# Patient Record
Sex: FEMALE | Race: BLACK OR AFRICAN AMERICAN | NOT HISPANIC OR LATINO | Employment: FULL TIME | URBAN - METROPOLITAN AREA
[De-identification: names, ages, dates, MRNs, and addresses within clinical notes are randomized per-mention and may not be internally consistent; named-entity substitution may affect disease eponyms.]

---

## 2024-11-20 ENCOUNTER — HOSPITAL ENCOUNTER (OUTPATIENT)
Facility: HOSPITAL | Age: 24
Discharge: HOME | End: 2024-11-20
Attending: EMERGENCY MEDICINE | Admitting: EMERGENCY MEDICINE
Payer: COMMERCIAL

## 2024-11-20 VITALS
HEART RATE: 88 BPM | WEIGHT: 124.8 LBS | HEIGHT: 63 IN | BODY MASS INDEX: 22.11 KG/M2 | OXYGEN SATURATION: 100 % | RESPIRATION RATE: 20 BRPM | TEMPERATURE: 98.7 F | SYSTOLIC BLOOD PRESSURE: 99 MMHG | DIASTOLIC BLOOD PRESSURE: 69 MMHG

## 2024-11-20 DIAGNOSIS — B97.89 SORE THROAT (VIRAL): ICD-10-CM

## 2024-11-20 DIAGNOSIS — J02.8 SORE THROAT (VIRAL): ICD-10-CM

## 2024-11-20 DIAGNOSIS — J06.9 VIRAL URI WITH COUGH: Primary | ICD-10-CM

## 2024-11-20 LAB
FLUAV SUBTYP SPEC NAA+PROBE: NOT DETECTED
FLUBV RNA ISLT QL NAA+PROBE: NOT DETECTED
SARS-COV-2 RNA RESP QL NAA+PROBE: NOT DETECTED
STREP A PCR: NOT DETECTED

## 2024-11-20 PROCEDURE — 87636 SARSCOV2 & INF A&B AMP PRB: CPT

## 2024-11-20 PROCEDURE — G0463 HOSPITAL OUTPT CLINIC VISIT: HCPCS

## 2024-11-20 PROCEDURE — 87651 STREP A DNA AMP PROBE: CPT | Performed by: EMERGENCY MEDICINE

## 2024-11-20 RX ORDER — CETIRIZINE HYDROCHLORIDE, PSEUDOEPHEDRINE HYDROCHLORIDE 5; 120 MG/1; MG/1
1 TABLET, FILM COATED, EXTENDED RELEASE ORAL 2 TIMES DAILY
Qty: 14 TABLET | Refills: 0 | Status: SHIPPED | OUTPATIENT
Start: 2024-11-20 | End: 2024-11-27

## 2024-11-20 RX ORDER — FLUTICASONE PROPIONATE 50 MCG
2 SPRAY, SUSPENSION (ML) NASAL DAILY
Qty: 18.2 G | Refills: 0 | Status: SHIPPED | OUTPATIENT
Start: 2024-11-20

## 2024-11-20 NOTE — DISCHARGE INSTRUCTIONS
Alternate Tylenol and ibuprofen as needed for sore throat and fever    Increase your fluid intake with water and Pedialyte    Take Zyrtec-D to help with symptoms of nasal congestion watery eyes runny nose sore throat    Begin Flonase nasal steroid spray daily for the next 7 to 10 days    Chloraseptic spray to help with sore throat    Follow-up with family doctor as needed return to ER for worsening symptom

## 2024-11-20 NOTE — Clinical Note
[facility] [place] [address] [place] [address] [place] [address]  Phone: [phone]    Laquita Sanchez was seen and treated in our emergency department on 11/20/2024.  She may return to work on 11/23/2024.         Thank you for choosing [facility].    Shahbaz Jenkins Jr., ANGY

## 2024-11-20 NOTE — FSED PROVIDER NOTE
Subjective   History of Present Illness  24-year-old female reports a 2-day history of sinus congestion nasal congestion drainage in the back of her throat loss of voice body aches and fatigue.  She is denying chest pain shortness of breath vomiting and diarrhea.        Review of Systems   All other systems reviewed and are negative.      History reviewed. No pertinent past medical history.    No Known Allergies    History reviewed. No pertinent surgical history.    History reviewed. No pertinent family history.    Social History     Socioeconomic History    Marital status: Single           Objective   Physical Exam  Vitals and nursing note reviewed.   Constitutional:       General: She is not in acute distress.     Appearance: She is well-developed. She is not toxic-appearing.   HENT:      Head: Normocephalic and atraumatic.      Right Ear: Tympanic membrane normal.      Left Ear: Tympanic membrane normal.      Nose: Congestion (Reddened nasal membranes clear discharge) present.      Mouth/Throat:      Mouth: Mucous membranes are moist.      Pharynx: Oropharynx is clear. Uvula midline.      Tonsils: No tonsillar exudate or tonsillar abscesses.   Eyes:      Conjunctiva/sclera: Conjunctivae normal.      Pupils: Pupils are equal, round, and reactive to light.   Cardiovascular:      Rate and Rhythm: Normal rate.      Heart sounds: Normal heart sounds.   Pulmonary:      Effort: Pulmonary effort is normal. No respiratory distress.      Breath sounds: Normal breath sounds. No stridor. No wheezing, rhonchi or rales.   Abdominal:      Palpations: Abdomen is soft.   Musculoskeletal:      Cervical back: Normal range of motion.   Skin:     General: Skin is warm.      Capillary Refill: Capillary refill takes less than 2 seconds.   Neurological:      General: No focal deficit present.      Mental Status: She is alert.         Procedures           ED Course  ED Course as of 11/20/24 1244   Wed Nov 20, 2024   1230 COVID influenza  strep not detected [WF]      ED Course User Index  [WF] Shahbaz Jenkins Jr., APRN                                           Medical Decision Making  Influenza and strep are negative suspect viral upper respiratory infection and postnasal drainage    Patient is not currently on any medications will discharge home with a combination of Flonase and Zyrtec, Chloraseptic spray    Problems Addressed:  Sore throat (viral): acute illness or injury  Viral URI with cough: acute illness or injury    Risk  OTC drugs.        Final diagnoses:   Viral URI with cough   Sore throat (viral)       ED Disposition  ED Disposition       ED Disposition   Discharge    Condition   Stable    Comment   --               Hansa Oh MD  [address] [address]  [phone]               Medication List        New Prescriptions      cetirizine-pseudoephedrine 5-120 MG per 12 hr tablet  Commonly known as: ZyrTEC-D  Take 1 tablet by mouth 2 (Two) Times a Day for 7 days.     fluticasone 50 MCG/ACT nasal spray  Commonly known as: FLONASE  Administer 2 sprays into the nostril(s) as directed by provider Daily.     phenol 1.4 % liquid liquid  Commonly known as: CHLORASEPTIC  Apply 1 spray to the mouth or throat Every 2 (Two) Hours As Needed (sore throat).               Where to Get Your Medications        [id number] - [place], [place] [address] AT [address] [place] [address] - [phone] [facility] [phone] [address], [place] [address]      Phone: [phone]   cetirizine-pseudoephedrine 5-120 MG per 12 hr tablet  fluticasone 50 MCG/ACT nasal spray  phenol 1.4 % liquid liquid